# Patient Record
Sex: FEMALE | Race: OTHER | HISPANIC OR LATINO | URBAN - METROPOLITAN AREA
[De-identification: names, ages, dates, MRNs, and addresses within clinical notes are randomized per-mention and may not be internally consistent; named-entity substitution may affect disease eponyms.]

---

## 2018-10-06 ENCOUNTER — EMERGENCY (EMERGENCY)
Facility: HOSPITAL | Age: 63
LOS: 1 days | Discharge: HOME | End: 2018-10-06
Attending: EMERGENCY MEDICINE
Payer: COMMERCIAL

## 2018-10-06 VITALS
RESPIRATION RATE: 20 BRPM | TEMPERATURE: 98 F | DIASTOLIC BLOOD PRESSURE: 97 MMHG | SYSTOLIC BLOOD PRESSURE: 151 MMHG | WEIGHT: 136.91 LBS | OXYGEN SATURATION: 100 % | HEART RATE: 98 BPM

## 2018-10-06 VITALS
TEMPERATURE: 98 F | OXYGEN SATURATION: 98 % | HEART RATE: 88 BPM | DIASTOLIC BLOOD PRESSURE: 73 MMHG | RESPIRATION RATE: 18 BRPM | SYSTOLIC BLOOD PRESSURE: 134 MMHG

## 2018-10-06 LAB
ALBUMIN SERPL ELPH-MCNC: 3.5 G/DL — SIGNIFICANT CHANGE UP (ref 3.5–5)
ALP SERPL-CCNC: 168 U/L — HIGH (ref 40–120)
ALT FLD-CCNC: 56 U/L DA — SIGNIFICANT CHANGE UP (ref 10–60)
ANION GAP SERPL CALC-SCNC: 7 MMOL/L — SIGNIFICANT CHANGE UP (ref 5–17)
APPEARANCE UR: CLEAR — SIGNIFICANT CHANGE UP
AST SERPL-CCNC: 45 U/L — HIGH (ref 10–40)
BASOPHILS # BLD AUTO: 0.2 K/UL — SIGNIFICANT CHANGE UP (ref 0–0.2)
BASOPHILS NFR BLD AUTO: 1.4 % — SIGNIFICANT CHANGE UP (ref 0–2)
BILIRUB SERPL-MCNC: 1.3 MG/DL — HIGH (ref 0.2–1.2)
BILIRUB UR-MCNC: NEGATIVE — SIGNIFICANT CHANGE UP
BUN SERPL-MCNC: 9 MG/DL — SIGNIFICANT CHANGE UP (ref 7–18)
CALCIUM SERPL-MCNC: 9.1 MG/DL — SIGNIFICANT CHANGE UP (ref 8.4–10.5)
CHLORIDE SERPL-SCNC: 103 MMOL/L — SIGNIFICANT CHANGE UP (ref 96–108)
CO2 SERPL-SCNC: 27 MMOL/L — SIGNIFICANT CHANGE UP (ref 22–31)
COLOR SPEC: YELLOW — SIGNIFICANT CHANGE UP
CREAT SERPL-MCNC: 0.75 MG/DL — SIGNIFICANT CHANGE UP (ref 0.5–1.3)
DIFF PNL FLD: ABNORMAL
EOSINOPHIL # BLD AUTO: 0.1 K/UL — SIGNIFICANT CHANGE UP (ref 0–0.5)
EOSINOPHIL NFR BLD AUTO: 0.8 % — SIGNIFICANT CHANGE UP (ref 0–6)
GLUCOSE SERPL-MCNC: 97 MG/DL — SIGNIFICANT CHANGE UP (ref 70–99)
GLUCOSE UR QL: NEGATIVE — SIGNIFICANT CHANGE UP
HCT VFR BLD CALC: 41.7 % — SIGNIFICANT CHANGE UP (ref 34.5–45)
HGB BLD-MCNC: 13.7 G/DL — SIGNIFICANT CHANGE UP (ref 11.5–15.5)
KETONES UR-MCNC: ABNORMAL
LEUKOCYTE ESTERASE UR-ACNC: NEGATIVE — SIGNIFICANT CHANGE UP
LYMPHOCYTES # BLD AUTO: 1.9 K/UL — SIGNIFICANT CHANGE UP (ref 1–3.3)
LYMPHOCYTES # BLD AUTO: 14.6 % — SIGNIFICANT CHANGE UP (ref 13–44)
MCHC RBC-ENTMCNC: 30.1 PG — SIGNIFICANT CHANGE UP (ref 27–34)
MCHC RBC-ENTMCNC: 32.9 GM/DL — SIGNIFICANT CHANGE UP (ref 32–36)
MCV RBC AUTO: 91.4 FL — SIGNIFICANT CHANGE UP (ref 80–100)
MONOCYTES # BLD AUTO: 1 K/UL — HIGH (ref 0–0.9)
MONOCYTES NFR BLD AUTO: 8 % — SIGNIFICANT CHANGE UP (ref 2–14)
NEUTROPHILS # BLD AUTO: 9.6 K/UL — HIGH (ref 1.8–7.4)
NEUTROPHILS NFR BLD AUTO: 75.2 % — SIGNIFICANT CHANGE UP (ref 43–77)
NITRITE UR-MCNC: NEGATIVE — SIGNIFICANT CHANGE UP
PH UR: 7 — SIGNIFICANT CHANGE UP (ref 5–8)
PLATELET # BLD AUTO: 246 K/UL — SIGNIFICANT CHANGE UP (ref 150–400)
POTASSIUM SERPL-MCNC: 4 MMOL/L — SIGNIFICANT CHANGE UP (ref 3.5–5.3)
POTASSIUM SERPL-SCNC: 4 MMOL/L — SIGNIFICANT CHANGE UP (ref 3.5–5.3)
PROT SERPL-MCNC: 7.7 G/DL — SIGNIFICANT CHANGE UP (ref 6–8.3)
PROT UR-MCNC: NEGATIVE — SIGNIFICANT CHANGE UP
RBC # BLD: 4.56 M/UL — SIGNIFICANT CHANGE UP (ref 3.8–5.2)
RBC # FLD: 11.2 % — SIGNIFICANT CHANGE UP (ref 10.3–14.5)
SODIUM SERPL-SCNC: 137 MMOL/L — SIGNIFICANT CHANGE UP (ref 135–145)
SP GR SPEC: 1.01 — SIGNIFICANT CHANGE UP (ref 1.01–1.02)
UROBILINOGEN FLD QL: 1
WBC # BLD: 12.8 K/UL — HIGH (ref 3.8–10.5)
WBC # FLD AUTO: 12.8 K/UL — HIGH (ref 3.8–10.5)

## 2018-10-06 PROCEDURE — 85027 COMPLETE CBC AUTOMATED: CPT

## 2018-10-06 PROCEDURE — 99284 EMERGENCY DEPT VISIT MOD MDM: CPT

## 2018-10-06 PROCEDURE — 96360 HYDRATION IV INFUSION INIT: CPT

## 2018-10-06 PROCEDURE — 80053 COMPREHEN METABOLIC PANEL: CPT

## 2018-10-06 PROCEDURE — 99283 EMERGENCY DEPT VISIT LOW MDM: CPT | Mod: 25

## 2018-10-06 PROCEDURE — 81001 URINALYSIS AUTO W/SCOPE: CPT

## 2018-10-06 PROCEDURE — 87086 URINE CULTURE/COLONY COUNT: CPT

## 2018-10-06 RX ORDER — CEFUROXIME AXETIL 250 MG
1 TABLET ORAL
Qty: 14 | Refills: 0 | OUTPATIENT
Start: 2018-10-06 | End: 2018-10-12

## 2018-10-06 RX ORDER — SODIUM CHLORIDE 9 MG/ML
1000 INJECTION INTRAMUSCULAR; INTRAVENOUS; SUBCUTANEOUS ONCE
Qty: 0 | Refills: 0 | Status: COMPLETED | OUTPATIENT
Start: 2018-10-06 | End: 2018-10-06

## 2018-10-06 RX ORDER — ACETAMINOPHEN 500 MG
650 TABLET ORAL ONCE
Qty: 0 | Refills: 0 | Status: COMPLETED | OUTPATIENT
Start: 2018-10-06 | End: 2018-10-06

## 2018-10-06 RX ADMIN — Medication 650 MILLIGRAM(S): at 10:43

## 2018-10-06 RX ADMIN — Medication 650 MILLIGRAM(S): at 11:13

## 2018-10-06 RX ADMIN — SODIUM CHLORIDE 1000 MILLILITER(S): 9 INJECTION INTRAMUSCULAR; INTRAVENOUS; SUBCUTANEOUS at 09:59

## 2018-10-06 RX ADMIN — SODIUM CHLORIDE 1000 MILLILITER(S): 9 INJECTION INTRAMUSCULAR; INTRAVENOUS; SUBCUTANEOUS at 11:00

## 2018-10-06 NOTE — ED PROVIDER NOTE - OBJECTIVE STATEMENT
my head hurts and my whole body hurts  also dysuria and frequency for four days  is on amoxicillin for tooth infeciton  also on simistatin and asa

## 2018-10-06 NOTE — ED PROVIDER NOTE - MEDICAL DECISION MAKING DETAILS
pt with symptoms of uti and headache and body aches  will check labs, ua and give ivf and pain meds and reassess  well appearing

## 2018-10-07 LAB
CULTURE RESULTS: NO GROWTH — SIGNIFICANT CHANGE UP
SPECIMEN SOURCE: SIGNIFICANT CHANGE UP

## 2021-04-02 NOTE — ED PROVIDER NOTE - CROS ED PSYCH ALL NEG
What Type Of Note Output Would You Prefer (Optional)?: Bullet Format Hpi Title: Evaluation of a Skin Lesion How Severe Are Your Spot(S)?: moderate Have Your Spot(S) Been Treated In The Past?: has not been treated How Severe Are Your Spot(S)?: mild Hpi Title: Evaluation of Skin Lesions negative...

## 2022-07-12 ENCOUNTER — EMERGENCY (EMERGENCY)
Facility: HOSPITAL | Age: 67
LOS: 1 days | Discharge: HOME | End: 2022-07-12
Attending: EMERGENCY MEDICINE
Payer: COMMERCIAL

## 2022-07-12 VITALS
TEMPERATURE: 98 F | WEIGHT: 130.07 LBS | HEIGHT: 59 IN | SYSTOLIC BLOOD PRESSURE: 146 MMHG | HEART RATE: 96 BPM | OXYGEN SATURATION: 96 % | DIASTOLIC BLOOD PRESSURE: 85 MMHG

## 2022-07-12 PROBLEM — E78.00 PURE HYPERCHOLESTEROLEMIA, UNSPECIFIED: Chronic | Status: ACTIVE | Noted: 2018-10-06

## 2022-07-12 LAB
ALBUMIN SERPL ELPH-MCNC: 3.7 G/DL — SIGNIFICANT CHANGE UP (ref 3.5–5)
ALP SERPL-CCNC: 87 U/L — SIGNIFICANT CHANGE UP (ref 40–120)
ALT FLD-CCNC: 37 U/L DA — SIGNIFICANT CHANGE UP (ref 10–60)
ANION GAP SERPL CALC-SCNC: 5 MMOL/L — SIGNIFICANT CHANGE UP (ref 5–17)
AST SERPL-CCNC: 26 U/L — SIGNIFICANT CHANGE UP (ref 10–40)
BASOPHILS # BLD AUTO: 0.02 K/UL — SIGNIFICANT CHANGE UP (ref 0–0.2)
BASOPHILS NFR BLD AUTO: 0.3 % — SIGNIFICANT CHANGE UP (ref 0–2)
BILIRUB SERPL-MCNC: 1 MG/DL — SIGNIFICANT CHANGE UP (ref 0.2–1.2)
BUN SERPL-MCNC: 14 MG/DL — SIGNIFICANT CHANGE UP (ref 7–18)
CALCIUM SERPL-MCNC: 9.8 MG/DL — SIGNIFICANT CHANGE UP (ref 8.4–10.5)
CHLORIDE SERPL-SCNC: 104 MMOL/L — SIGNIFICANT CHANGE UP (ref 96–108)
CO2 SERPL-SCNC: 27 MMOL/L — SIGNIFICANT CHANGE UP (ref 22–31)
CREAT SERPL-MCNC: 0.69 MG/DL — SIGNIFICANT CHANGE UP (ref 0.5–1.3)
D DIMER BLD IA.RAPID-MCNC: <150 NG/ML DDU — SIGNIFICANT CHANGE UP
EGFR: 96 ML/MIN/1.73M2 — SIGNIFICANT CHANGE UP
EOSINOPHIL # BLD AUTO: 0 K/UL — SIGNIFICANT CHANGE UP (ref 0–0.5)
EOSINOPHIL NFR BLD AUTO: 0 % — SIGNIFICANT CHANGE UP (ref 0–6)
GLUCOSE SERPL-MCNC: 107 MG/DL — HIGH (ref 70–99)
HCT VFR BLD CALC: 39.6 % — SIGNIFICANT CHANGE UP (ref 34.5–45)
HGB BLD-MCNC: 13.3 G/DL — SIGNIFICANT CHANGE UP (ref 11.5–15.5)
IMM GRANULOCYTES NFR BLD AUTO: 0.4 % — SIGNIFICANT CHANGE UP (ref 0–1.5)
LYMPHOCYTES # BLD AUTO: 0.86 K/UL — LOW (ref 1–3.3)
LYMPHOCYTES # BLD AUTO: 11.3 % — LOW (ref 13–44)
MCHC RBC-ENTMCNC: 31.4 PG — SIGNIFICANT CHANGE UP (ref 27–34)
MCHC RBC-ENTMCNC: 33.6 GM/DL — SIGNIFICANT CHANGE UP (ref 32–36)
MCV RBC AUTO: 93.4 FL — SIGNIFICANT CHANGE UP (ref 80–100)
MONOCYTES # BLD AUTO: 0.96 K/UL — HIGH (ref 0–0.9)
MONOCYTES NFR BLD AUTO: 12.6 % — SIGNIFICANT CHANGE UP (ref 2–14)
NEUTROPHILS # BLD AUTO: 5.74 K/UL — SIGNIFICANT CHANGE UP (ref 1.8–7.4)
NEUTROPHILS NFR BLD AUTO: 75.4 % — SIGNIFICANT CHANGE UP (ref 43–77)
NRBC # BLD: 0 /100 WBCS — SIGNIFICANT CHANGE UP (ref 0–0)
PLATELET # BLD AUTO: 221 K/UL — SIGNIFICANT CHANGE UP (ref 150–400)
POTASSIUM SERPL-MCNC: 4.1 MMOL/L — SIGNIFICANT CHANGE UP (ref 3.5–5.3)
POTASSIUM SERPL-SCNC: 4.1 MMOL/L — SIGNIFICANT CHANGE UP (ref 3.5–5.3)
PROCALCITONIN SERPL-MCNC: 0.06 NG/ML — SIGNIFICANT CHANGE UP (ref 0.02–0.1)
PROT SERPL-MCNC: 7.6 G/DL — SIGNIFICANT CHANGE UP (ref 6–8.3)
RBC # BLD: 4.24 M/UL — SIGNIFICANT CHANGE UP (ref 3.8–5.2)
RBC # FLD: 12.6 % — SIGNIFICANT CHANGE UP (ref 10.3–14.5)
SARS-COV-2 RNA SPEC QL NAA+PROBE: DETECTED
SODIUM SERPL-SCNC: 136 MMOL/L — SIGNIFICANT CHANGE UP (ref 135–145)
TROPONIN I, HIGH SENSITIVITY RESULT: 5.8 NG/L — SIGNIFICANT CHANGE UP
WBC # BLD: 7.61 K/UL — SIGNIFICANT CHANGE UP (ref 3.8–10.5)
WBC # FLD AUTO: 7.61 K/UL — SIGNIFICANT CHANGE UP (ref 3.8–10.5)

## 2022-07-12 PROCEDURE — 71045 X-RAY EXAM CHEST 1 VIEW: CPT

## 2022-07-12 PROCEDURE — 99285 EMERGENCY DEPT VISIT HI MDM: CPT

## 2022-07-12 PROCEDURE — 99285 EMERGENCY DEPT VISIT HI MDM: CPT | Mod: 25

## 2022-07-12 PROCEDURE — 85379 FIBRIN DEGRADATION QUANT: CPT

## 2022-07-12 PROCEDURE — 93005 ELECTROCARDIOGRAM TRACING: CPT

## 2022-07-12 PROCEDURE — 86140 C-REACTIVE PROTEIN: CPT

## 2022-07-12 PROCEDURE — 71045 X-RAY EXAM CHEST 1 VIEW: CPT | Mod: 26

## 2022-07-12 PROCEDURE — 36415 COLL VENOUS BLD VENIPUNCTURE: CPT

## 2022-07-12 PROCEDURE — 82728 ASSAY OF FERRITIN: CPT

## 2022-07-12 PROCEDURE — 96374 THER/PROPH/DIAG INJ IV PUSH: CPT

## 2022-07-12 PROCEDURE — 85025 COMPLETE CBC W/AUTO DIFF WBC: CPT

## 2022-07-12 PROCEDURE — 84484 ASSAY OF TROPONIN QUANT: CPT

## 2022-07-12 PROCEDURE — 80053 COMPREHEN METABOLIC PANEL: CPT

## 2022-07-12 PROCEDURE — 87635 SARS-COV-2 COVID-19 AMP PRB: CPT

## 2022-07-12 PROCEDURE — 84145 PROCALCITONIN (PCT): CPT

## 2022-07-12 RX ORDER — METOCLOPRAMIDE HCL 10 MG
5 TABLET ORAL ONCE
Refills: 0 | Status: COMPLETED | OUTPATIENT
Start: 2022-07-12 | End: 2022-07-12

## 2022-07-12 RX ORDER — ACETAMINOPHEN 500 MG
650 TABLET ORAL ONCE
Refills: 0 | Status: COMPLETED | OUTPATIENT
Start: 2022-07-12 | End: 2022-07-12

## 2022-07-12 RX ADMIN — Medication 650 MILLIGRAM(S): at 15:37

## 2022-07-12 RX ADMIN — Medication 5 MILLIGRAM(S): at 15:37

## 2022-07-12 NOTE — ED PROVIDER NOTE - PROGRESS NOTE DETAILS
Patient is resting comfortably, NAD. Signed out to Dr. Millan pending labs. If d-dimer normal and VS stable, may d/c. Pt feels better no complaints. Educated on results care and f/u with patient and .

## 2022-07-12 NOTE — ED PROVIDER NOTE - PATIENT PORTAL LINK FT
You can access the FollowMyHealth Patient Portal offered by Health system by registering at the following website: [email or web address]. By joining [facility]’s FollowMyHealth portal, you will also be able to view your health information using other applications (apps) compatible with our system.

## 2022-07-12 NOTE — ED PROVIDER NOTE - CLINICAL SUMMARY MEDICAL DECISION MAKING FREE TEXT BOX
Patient with COVID-19 presenting with chest pain. Normal EKG. Will check labs, CXR, reassess. Low risk headache, gradual onset, similar to prior headaches, no fever, no meningismus, no neuro symptoms. HA likely due to COVID-19.

## 2022-07-12 NOTE — ED ADULT TRIAGE NOTE - CHIEF COMPLAINT QUOTE
Covid positive rapid test today with c/o chest pain and headache sent from [facility] to r/o PE

## 2022-07-12 NOTE — ED PROVIDER NOTE - IV ALTEPLASE DOOR HIDDEN
"Chief Complaint   Patient presents with     Pharyngitis     started yesterday       Initial /72  Pulse 92  Temp 99.1  F (37.3  C) (Oral)  SpO2 97% Estimated body mass index is 27.26 kg/(m^2) as calculated from the following:    Height as of 4/11/18: 5' 10\" (1.778 m).    Weight as of 4/11/18: 190 lb (86.2 kg).  Medication Reconciliation: complete   Jennie Carvalho      "
show

## 2022-07-12 NOTE — ED PROVIDER NOTE - OBJECTIVE STATEMENT
Patient tested positive for COVID yesterday. Developed a severe, frontal headache, throbbing, non-radiating, gradual onset. Also dry cough and mild chest pain with cough. Went to [facility] and sent to ED to r/o PE. Patient had history of migraines associated with her periods. This feels like one of her migraines. No fever, so, ap, n/v/d.

## 2022-07-12 NOTE — ED ADULT NURSE NOTE - NSIMPLEMENTINTERV_GEN_ALL_ED
Implemented All Universal Safety Interventions:  [place] to call system. Call bell, personal items and telephone within reach. Instruct patient to call for assistance. Room bathroom lighting operational. Non-slip footwear when patient is off stretcher. Physically safe environment: no spills, clutter or unnecessary equipment. Stretcher in lowest position, wheels locked, appropriate side rails in place.

## 2022-07-12 NOTE — ED ADULT NURSE NOTE - CHIEF COMPLAINT QUOTE
Covid positive rapid test today with c/o chest pain and headache [place] [facility] [place]

## 2022-07-12 NOTE — ED PROVIDER NOTE - NSFOLLOWUPINSTRUCTIONS_ED_ALL_ED_FT
COVID-19: Qué hacer si está enfermo    COVID-19: What to Do if You Are Sick      Si obtiene un resultado positivo en la prueba y es un adulto mayor o alguien que tiene un alto riesgo de enfermarse de gravedad a causa del COVID-19, puede david tratamiento disponible. Comuníquese de inmediato con un médico después de [place] prueba con resultado positivo para determinar si es elegible para recibir tratamiento, incluso si kori síntomas son leves en emily momento. También puede acudir a un centro de prueba de detección y tratamiento (Test to Treat) y, si es elegible, recibir keturah receta de un médico. No se demore: El tratamiento debe iniciarse en los primeros días para que sea eficaz.    Si tiene fiebre, tos u otros síntomas, podría tener COVID-19. La mayoría de las personas tiene keturah enfermedad leve y puede recuperarse en el hogar. Si está enfermo:  •Lleve un registro de los síntomas.      •Si tiene keturah señal de advertencia de emergencia (que incluye dificultad para respirar), llame al 911.        Pasos para ayudar a prevenir la propagación del COVID-19 si está enfermo    Si tiene COVID-19 o piensa que podría tener esta enfermedad, siga los pasos a continuación para cuidarse y ayudar a proteger a otras personas de gracia hogar y gracia comunidad.    Quédese en gracia casa, excepto para obtener atención médica     • Quédese en gracia casa. La mayoría de las personas con COVID-19 tiene keturah enfermedad leve y puede recuperarse en el hogar sin atención médica. No salga de gracia casa, excepto para recibir atención médica. No vaya a lugares públicos ni a otros lugares donde no pueda usar mascarilla.      • Cuídese. Descanse y manténgase hidratado. [place] medicamentos de venta sahara, margarito paracetamol, para sentirse mejor.      • Manténgase en contacto con gracia médico. Llame antes de recibir atención médica. Asegúrese de recibir atención si tiene dificultad para respirar, o si tiene algún otro signo de advertencia de emergencia, o si johnie que se trata de keturah emergencia.      • Evite el transporte público, compartir vehículos o cooper taxis, si es posible.      Sométase a pruebas de detección     •Si tiene síntomas de COVID-19, sométase a pruebas de detección. Mientras espera los resultados de las pruebas, aléjese de otras personas, incluso manténgase apartado de las personas que viven en gracia hogar.      • Hágase las pruebas lo antes posible después de que comiencen kori síntomas. Puede david tratamientos disponibles para personas con COVID-19 que estén en riesgo de enfermar gravemente. No se demore: El tratamiento debe iniciarse de forma temprana para que sea eficaz; algunos tratamientos deben comenzar en el lapso de los 5 días siguientes a los primeros síntomas. Si el resultado de gracia prueba es positivo, comuníquese de inmediato con gracia médico para determinar si es elegible.      •Las pruebas de autodiagnóstico son keturah de las diversas opciones para realizar pruebas de detección del virus que causa el COVID-19 y pueden ser más convenientes que las pruebas de laboratorio y las pruebas en los puntos de atención. Consulte a gracia médico o al departamento de tiesha local si necesita ayuda para interpretar los resultados de las pruebas.      •Puede visitar el sitio web de gracia departamento de tiesha estatal, [place], local y territorial para encontrar la información local más reciente sobre las pruebas de detección.      Sepárese de otras personas     En la mayor medida posible, permanezca en keturah habitación específica y alejado de otras personas y mascotas en gracia hogar. Si es posible, use un baño aparte. Si tiene que estar cerca de otras personas o animales dentro o fuera de la casa, use keturah mascarilla aristeo ajustada.    Dígales a kori contactos estrechos que pueden david estado expuestos al COVID-19. [place]ur[place] persona infectada puede propagar el COVID-19 a partir de 48 horas (o 2 días) antes de que la persona presente cualquier síntoma o tenga un resultado positivo en la prueba de detección. Al informar a kori contactos estrechos que pueden david estado expuestos al COVID-19, está ayudando a proteger a todos.  •Consulte COVID-19 y los animales si tiene preguntas sobre las mascotas.      •Si se le diagnostica COVID-19, alguien del departamento de tiesha puede llamarlo. Responda la llamada para ralentizar la propagación.      Controle kori síntomas    •Los síntomas de COVID-19 incluyen fiebre, tos u otros síntomas.       •Siga las instrucciones del médico y del departamento de tiesha local. Las autoridades de tiesha locales podrán darle instrucciones para controlar kori síntomas y comunicar la información.      Cuándo solicitar atención médica de emergencia    Esté atento a los signos de advertencia de emergencia* para el COVID-19. Si alguien muestra alguno de estos signos, solicite atención médica de emergencia de inmediato:  •Problemas respiratorios      •Dolor u opresión persistentes en el pecho      •Confusión nueva      •Incapacidad de despertarse o permanecer despierto      •Piel, labios o lechos de las uñas pálidos o de color grisáceo o azulado, según el mamadou de la piel      *En esta lista no están todos los síntomas posibles. Llame al médico si tiene otros síntomas que [place] graves o le preocupen.    Llame al 911 o llame con anticipación al centro de emergencias de gracia localidad: Informe al operador que está buscando atención para alguien que tiene o puede tener COVID-19.    Llame con anticipación antes de visitar al médico    •Llame con anticipación. Muchas visitas médicas para la atención de rutina se están posponiendo o se realizan por teléfono o telemedicina.      •Si tiene keturah katey médica que no se puede posponer, llame al consultorio del médico e informe que tiene o puede tener COVID-19. [place] ayudará a que quienes trabajan en el consultorio puedan protegerse y proteger a otros pacientes.      Si está enfermo, use keturah mascarilla aristeo ajustada    •Debe usar keturah mascarilla si debe estar cerca de otras personas o animales, incluidas las mascotas (incluso en gracia casa).      •Lleve keturah mascarilla con el mejor ajuste, protección y comodidad para usted.      •No es necesario que use la mascarilla si está solo. Si no puede ponerse keturah mascarilla (debido a que tiene dificultad para respirar, por ejemplo), cúbrase de algún otro modo cuando tosa o estornude. Trate de mantenerse al menos a keturah distancia de 6 pies (1.8 m) de las demás personas. [place] ayudará a proteger a las personas que lo rodean.      •Los niños menores de 2 años de edad, las personas con problemas para respirar o las personas que no pueden quitarse las mascarillas sin ayuda no deben utilizar mascarilla.      Cúbrase al toser y estornudar    •Cúbrase la boca y la nariz con un pañuelo descartable cuando tosa o estornude.      •Arroje los pañuelos descartables usados en un cesto de basura que tenga keturah bolsa.      •Inmediatamente, lávese las leyla con agua y jabón mariela al menos 20 segundos. Si no dispone de agua y jabón, límpiese las leyla con un desinfectante de leyla a base de alcohol que contenga al menos un 60 % de alcohol.      Límpiese las leyla con frecuencia    •Lávese las leyla frecuentemente con agua y jabón mariela al menos 20 segundos. [place] es especialmente importante después de sonarse la nariz, toser o estornudar, ir al baño y antes de comer o preparar alimentos.      •Utilice un desinfectante para leyla si no dispone de agua y jabón. Use un desinfectante para leyla a base de alcohol con al menos un 60 % de alcohol, cubriéndose todas las superficies de las leyla y frotándoselas hasta que se sientan secas.      •Usar agua y jabón es la mejor opción, especialmente si las leyla están sucias.       •No se toque los ojos, la nariz ni la boca sin antes lavarse las leyla.      •Consejos para el lavado de leyla      Evite compartir artículos domésticos personales    •No comparta platos, vasos, tazas, utensilios para comer, toallas ni ropa de cama con otras personas en gracia casa.      •Después de usar estos elementos, lávelos aristeo con agua y jabón o póngalos en el lavavajillas.      Limpie todas las superficies de gracia casa    •Limpie y desinfecte las superficies de alto contacto (por ejemplo, picaportes de monse, mesas, manijas, interruptores de shawn y encimeras) en gracia “habitación de enfermo” y baño. En espacios compartidos, debe limpiar y desinfectar las superficies y los artículos después de que los use la persona enferma.      •Si está enfermo y no puede limpiar, un cuidador u otra persona debe limpiar y desinfectar solo el lugar que le rodea (margarito gracia dormitorio y baño) según sea necesario. Gracia cuidador/otra persona debe esperar el mayor tiempo posible (al menos varias horas) y llevar mascarilla antes de entrar, limpiar y desinfectar los espacios compartidos que usted use.      •Limpie y desinfecte las zonas que puedan tener alan, heces o líquidos corporales en gracia superficie.    •Use limpiadores y desinfectantes domésticos. Limpie las superficies con suciedad visible con limpiadores domésticos que contengan jabón o detergente. Luego, use un desinfectante de uso doméstico.  •Use un producto de la lista N de la Environmental Protection Agency (EPA) (Agencia de Protección Ambiental): Desinfectantes para coronavirus (COVID-19).      •No olvide seguir las instrucciones de la etiqueta para asegurarse de usar el producto de manera carr y eficaz. Para muchos productos, se recomienda mantener la superficie húmeda con un desinfectante mariela un cierto período de tiempo (consulte el “tiempo de contacto” en la etiqueta del producto).      •Es posible que también deba usar equipo de protección personal, margarito guantes, dependiendo de las instrucciones de la etiqueta del producto.      •Inmediatamente después de la desinfección, lávese las leyla con agua y jabón mariela 20 segundos.      •Para obtener orientación completa sobre la limpieza y desinfección de gracia hogar, consulte la Orientación para la desinfección completa.        [place] medidas para mejorar la ventilación en gracia casa    •Mejore la ventilación (el flujo de aire) en casa para ayudar a evitar el contagio del COVID-19 a otras personas de gracia hogar.      •Despeje las partículas del virus del COVID-19 en el aire abriendo las ventanas, utilizando filtros de aire y encendiendo los ventiladores de gracia hogar.      •Use esta herramienta interactiva para aprender a mejorar el flujo de aire en gracia hogar.        Cuándo puede estar cerca de otras personas después de tener COVID-19    Decidir cuándo puede rodearse de otras personas es diferente para distintas situaciones. [place] cuándo puede finalizar el aislamiento en casa de forma carr.    Para cualquier pregunta adicional sobre gracia atención, póngase en contacto con gracia médico o con el departamento de tiesha local o estatal.    03/22/2022    Sanya del contenido: National Center for Immunization and Respiratory Diseases (NCIRD), Division of Viral Diseases (División de Enfermedades Virales del Centro Nacional de Vacunación y Enfermedades Respiratorias)    Esta información no tiene margarito fin reemplazar el consejo del médico. Asegúrese de hacerle al médico cualquier pregunta que tenga.

## 2022-07-13 LAB
CRP SERPL-MCNC: 13 MG/L — HIGH
FERRITIN SERPL-MCNC: 285 NG/ML — HIGH (ref 15–150)

## 2024-01-13 PROBLEM — I10 ESSENTIAL (PRIMARY) HYPERTENSION: Chronic | Status: ACTIVE | Noted: 2022-07-12

## 2024-01-13 PROBLEM — G43.909 MIGRAINE, UNSPECIFIED, NOT INTRACTABLE, WITHOUT STATUS MIGRAINOSUS: Chronic | Status: ACTIVE | Noted: 2022-07-12

## 2024-02-15 ENCOUNTER — APPOINTMENT (OUTPATIENT)
Dept: ENDOCRINOLOGY | Facility: CLINIC | Age: 69
End: 2024-02-15
Payer: COMMERCIAL

## 2024-02-15 VITALS
OXYGEN SATURATION: 95 % | BODY MASS INDEX: 25.2 KG/M2 | TEMPERATURE: 98.2 F | DIASTOLIC BLOOD PRESSURE: 79 MMHG | HEART RATE: 100 BPM | WEIGHT: 125 LBS | SYSTOLIC BLOOD PRESSURE: 126 MMHG | HEIGHT: 59 IN

## 2024-02-15 DIAGNOSIS — M81.0 AGE-RELATED OSTEOPOROSIS W/OUT CURRENT PATHOLOGICAL FRACTURE: ICD-10-CM

## 2024-02-15 DIAGNOSIS — Z79.811 LONG TERM (CURRENT) USE OF AROMATASE INHIBITORS: ICD-10-CM

## 2024-02-15 PROCEDURE — 99205 OFFICE O/P NEW HI 60 MIN: CPT

## 2024-02-15 PROCEDURE — G2211 COMPLEX E/M VISIT ADD ON: CPT | Mod: NC,1L

## 2024-02-15 NOTE — ASSESSMENT
[FreeTextEntry1] : 68 year old female with PMH of osteoporosis, breast cancer HLD and HTN, presents for management of her osteoporosis.  #Osteoporosis -Likely 2/2 to anastrazole treatment and cessation of prolia for 12 months  -Will check secondary screen today  -Not on vitamin D - will check and replace as needed -Calcium 3x serves per day  -Weight bearing exercises  -As LS is the lowest T score, would benefit from ongoing prolia. Side effects discussed with patient including osteonecrosis of jawa. Explained to patient that prolia cannot be suddenly stopped as risk of bone loss and vertebral rebound fractures. If dental work needs to be done, can be done just prior to next dose of prolia (next due in March). If dentist wants to delay next dose, would only delay by a couple of months at most (May 2024), as risk of rebound vertebral fractures.  -In future, if patient wants to cease prolia, will need consolidation of treatment with either IV or oral bisphosphonates  -Pamphlet regarding osteoporosis provided to patient  -Patient due for next prolia in March, patient prefers to have it done in [place]. Advised patient if she changes her mind, can organize to have here at office, but will need to sc an appointment with NP.   #AR bleeding  -Advised patient to follow up with her PCP as soon as possible

## 2024-02-15 NOTE — HISTORY OF PRESENT ILLNESS
[FreeTextEntry1] :  ID [id number] 68 year old female with PMH of osteoporosis, breast cancer HLD and HTN, presents for management of her osteoporosis   #L) sided breast cancer -Diagnosed 2019 -Treatment  Lumpectomy 8/21/19 Radiotherapy  11/25/19 Anastrazole 11/25-19- ongoing (plan for 5 years, complete November 2024)  #Osteoporosis  Diagnosis: 2020 Current regimen: prolia June 2020-December 2021 (4x doses) stopped because of dental work, resumed 3/15/23 last dose (was receiving through [facility] from Dr Noriega) Calcium/Vitamin D: not taking any supplements  Prior treatment: prolia  Last DXA scan: BMD 12/26/23 compared with BMD form 2021  L1-L3 -2.7 no significant change  FN -1.7 >5% change Total hip -2 no significant change Radius -1.2   History of fractures:no Frequent falls: no FH: no Prior radiation? yes Steroid use: no Smoking: no Menopause: HRT? no History of kidney stones? no History of thyroid problem  no History of renal/liver dysfunction no Malabsorption ?lactose   Labs:  Calcium:  Vitamin D  GFR  PTH TSH Testosterone Hemoglobin A1c   Meds Atorvastatin 40mg daily  Losartan

## 2024-02-16 LAB
25(OH)D3 SERPL-MCNC: 31.5 NG/ML
ALBUMIN SERPL ELPH-MCNC: 4.5 G/DL
ALP BLD-CCNC: 91 U/L
ALT SERPL-CCNC: 31 U/L
ANION GAP SERPL CALC-SCNC: 13 MMOL/L
AST SERPL-CCNC: 37 U/L
BILIRUB SERPL-MCNC: 0.9 MG/DL
BUN SERPL-MCNC: 10 MG/DL
CALCIUM SERPL-MCNC: 10.4 MG/DL
CALCIUM SERPL-MCNC: 10.4 MG/DL
CHLORIDE SERPL-SCNC: 102 MMOL/L
CO2 SERPL-SCNC: 22 MMOL/L
CREAT SERPL-MCNC: 0.6 MG/DL
EGFR: 98 ML/MIN/1.73M2
GLUCOSE SERPL-MCNC: 103 MG/DL
MAGNESIUM SERPL-MCNC: 2.3 MG/DL
PARATHYROID HORMONE INTACT: 36 PG/ML
PHOSPHATE SERPL-MCNC: 3.4 MG/DL
POTASSIUM SERPL-SCNC: 4.2 MMOL/L
PROT SERPL-MCNC: 7.8 G/DL
SODIUM SERPL-SCNC: 137 MMOL/L
TSH SERPL-ACNC: 2.71 UIU/ML

## 2024-02-20 LAB — COLLAGEN CTX SERPL-MCNC: 61 PG/ML

## 2024-08-15 ENCOUNTER — APPOINTMENT (OUTPATIENT)
Dept: ENDOCRINOLOGY | Facility: CLINIC | Age: 69
End: 2024-08-15
Payer: MEDICARE

## 2024-08-15 VITALS
HEIGHT: 59 IN | DIASTOLIC BLOOD PRESSURE: 83 MMHG | OXYGEN SATURATION: 95 % | SYSTOLIC BLOOD PRESSURE: 139 MMHG | WEIGHT: 127 LBS | BODY MASS INDEX: 25.6 KG/M2 | HEART RATE: 70 BPM

## 2024-08-15 DIAGNOSIS — Z79.811 LONG TERM (CURRENT) USE OF AROMATASE INHIBITORS: ICD-10-CM

## 2024-08-15 DIAGNOSIS — M81.0 AGE-RELATED OSTEOPOROSIS W/OUT CURRENT PATHOLOGICAL FRACTURE: ICD-10-CM

## 2024-08-15 PROCEDURE — 99205 OFFICE O/P NEW HI 60 MIN: CPT

## 2024-08-15 PROCEDURE — G2211 COMPLEX E/M VISIT ADD ON: CPT

## 2024-08-15 NOTE — ASSESSMENT
[FreeTextEntry1] : 69 year old female with PMH of osteoporosis, breast cancer HLD and HTN, presents for management of her osteoporosis.  #Osteoporosis #Aromatase inhibitor use  -Likely 2/2 to anastrazole treatment and cessation of prolia for 12 months -Anastrazole due to cease in November 2024 -Secondary screen WNL -C/W vitamin D 2000 IU daily and calcium carbonate 1 daily  -Calcium 3x serves per day -Weight bearing exercises -As LS is the lowest T score, would benefit from ongoing prolia. Side effects discussed with patient including osteonecrosis of jaw. Explained to patient that prolia cannot be suddenly stopped as risk of bone loss and vertebral rebound fractures. If dental work needs to be done, can be done just prior to next dose of prolia (next due in March). If dentist wants to delay next dose, would only delay by a couple of months at most (May 2024), as risk of rebound vertebral fractures. Pt currently has no active dental issues. -In future, if patient wants to cease prolia, will need consolidation of treatment with either IV or oral bisphosphonates -On prolia since June 2020-2021 (4x doses) then stopped. Restarted March14th 2024  -Last dose of prolia given 14th March -> due September 16th 2024 (labs today)  -Next BMD Dec 2025 -Patient due for next prolia September 14th, patient prefers switch her prolia from [place] to our local site (will organize appointment with our NP)  Review in 6 months

## 2024-08-15 NOTE — HISTORY OF PRESENT ILLNESS
[FreeTextEntry1] :  ID [id number] 69 year old female with PMH of osteoporosis, breast cancer HLD and HTN, presents for management of her osteoporosis  #L) sided breast cancer -Diagnosed 2019 -Treatment Lumpectomy 8/21/19 Radiotherapy 11/25/19 Anastrazole 11/25-19- ongoing (plan for 5 years, complete November 2024)  #Osteoporosis Diagnosis: 2020 Current regimen: prolia June 2020-December 2021 (4x doses) stopped because of dental work, resumed 3/15/23 last dose (was receiving through [facility] from Dr Noriega) Calcium/Vitamin D: taking calcium 600mg +D 1 daily and 2000IU vit D daily  Prior treatment: prolia Last DXA scan: BMD 12/26/23 compared with BMD form 2021  L1-L3 -2.7 no significant change FN -1.7 >5% change Total hip -2 no significant change Radius -1.2  History of fractures:no Frequent falls: no FH: no Prior radiation? yes Steroid use: no Smoking: no Menopause: HRT? no History of kidney stones? no History of thyroid problem no History of renal/liver dysfunction no Malabsorption ?lactose  Labs: Feb 2024 Calcium: 10.4 Vitamin D 31.5 GFR 98 PTH 36 TSH 2.71 Hemoglobin A1c CTx 61   Meds Atorvastatin 40mg daily Losartan

## 2024-08-16 LAB
25(OH)D3 SERPL-MCNC: 33.9 NG/ML
ALBUMIN SERPL ELPH-MCNC: 4.7 G/DL
ALP BLD-CCNC: 101 U/L
ALT SERPL-CCNC: 21 U/L
ANION GAP SERPL CALC-SCNC: 16 MMOL/L
AST SERPL-CCNC: 29 U/L
BILIRUB SERPL-MCNC: 1 MG/DL
BUN SERPL-MCNC: 10 MG/DL
CALCIUM SERPL-MCNC: 9.8 MG/DL
CHLORIDE SERPL-SCNC: 103 MMOL/L
CO2 SERPL-SCNC: 21 MMOL/L
CREAT SERPL-MCNC: 0.66 MG/DL
EGFR: 95 ML/MIN/1.73M2
GLUCOSE SERPL-MCNC: 89 MG/DL
POTASSIUM SERPL-SCNC: 4.1 MMOL/L
PROT SERPL-MCNC: 7.8 G/DL
SODIUM SERPL-SCNC: 140 MMOL/L

## 2024-09-16 ENCOUNTER — APPOINTMENT (OUTPATIENT)
Dept: ENDOCRINOLOGY | Facility: CLINIC | Age: 69
End: 2024-09-16
Payer: MEDICARE

## 2024-09-16 ENCOUNTER — MED ADMIN CHARGE (OUTPATIENT)
Age: 69
End: 2024-09-16

## 2024-09-16 PROCEDURE — 96372 THER/PROPH/DIAG INJ SC/IM: CPT

## 2024-09-16 RX ORDER — DENOSUMAB 60 MG/ML
60 INJECTION SUBCUTANEOUS
Qty: 1 | Refills: 0 | Status: COMPLETED | OUTPATIENT
Start: 2024-09-16

## 2024-09-16 RX ADMIN — DENOSUMAB 60 MG/ML: 60 INJECTION SUBCUTANEOUS at 00:00

## 2025-03-17 ENCOUNTER — APPOINTMENT (OUTPATIENT)
Dept: ENDOCRINOLOGY | Facility: CLINIC | Age: 70
End: 2025-03-17

## 2025-03-31 NOTE — ED PROVIDER NOTE - RELIEVING FACTORS
Problem: Potential for Falls  Goal: Patient will remain free of falls  Description: INTERVENTIONS:- Educate patient/family on patient safety including physical limitations- Instruct patient to call for assistance with activity - Consult OT/PT to assist with strengthening/mobility - Keep Call bell within reach- Keep bed low and locked with side rails adjusted as appropriate- Keep care items and personal belongings within reach- Initiate and maintain comfort rounds- Make Fall Risk Sign visible to staff- Offer Toileting every  Hours, in advance of need- Initiate/Maintain alarm- Obtain necessary fall risk management equipment: - Apply yellow socks and bracelet for high fall risk patients- Consider moving patient to room near nurses station  Outcome: Progressing      none

## 2025-04-10 ENCOUNTER — APPOINTMENT (OUTPATIENT)
Dept: ENDOCRINOLOGY | Facility: CLINIC | Age: 70
End: 2025-04-10
Payer: MEDICARE

## 2025-04-10 VITALS
HEART RATE: 87 BPM | OXYGEN SATURATION: 98 % | WEIGHT: 120 LBS | BODY MASS INDEX: 24.52 KG/M2 | SYSTOLIC BLOOD PRESSURE: 120 MMHG | DIASTOLIC BLOOD PRESSURE: 82 MMHG | HEIGHT: 58.8 IN

## 2025-04-10 DIAGNOSIS — M81.0 AGE-RELATED OSTEOPOROSIS W/OUT CURRENT PATHOLOGICAL FRACTURE: ICD-10-CM

## 2025-04-10 DIAGNOSIS — Z79.811 LONG TERM (CURRENT) USE OF AROMATASE INHIBITORS: ICD-10-CM

## 2025-04-10 PROCEDURE — ZZZZZ: CPT

## 2025-04-10 PROCEDURE — 96372 THER/PROPH/DIAG INJ SC/IM: CPT

## 2025-04-10 PROCEDURE — 77080 DXA BONE DENSITY AXIAL: CPT

## 2025-04-10 PROCEDURE — 99215 OFFICE O/P EST HI 40 MIN: CPT | Mod: 25

## 2025-04-10 RX ORDER — DENOSUMAB 60 MG/ML
60 INJECTION SUBCUTANEOUS
Refills: 0 | Status: ACTIVE | COMMUNITY

## 2025-04-10 RX ORDER — ATORVASTATIN CALCIUM 80 MG/1
TABLET, FILM COATED ORAL
Refills: 0 | Status: ACTIVE | COMMUNITY

## 2025-04-10 RX ORDER — LOSARTAN POTASSIUM 100 MG/1
TABLET, FILM COATED ORAL
Refills: 0 | Status: ACTIVE | COMMUNITY

## 2025-04-10 RX ORDER — CHOLECALCIFEROL (VITAMIN D3) 25 MCG
TABLET ORAL
Refills: 0 | Status: ACTIVE | COMMUNITY

## 2025-04-10 RX ORDER — DENOSUMAB 60 MG/ML
60 INJECTION SUBCUTANEOUS
Qty: 1 | Refills: 0 | Status: COMPLETED | OUTPATIENT
Start: 2025-04-10

## 2025-04-10 RX ADMIN — DENOSUMAB 60 MG/ML: 60 INJECTION SUBCUTANEOUS at 00:00
